# Patient Record
Sex: FEMALE | NOT HISPANIC OR LATINO | Employment: FULL TIME | URBAN - METROPOLITAN AREA
[De-identification: names, ages, dates, MRNs, and addresses within clinical notes are randomized per-mention and may not be internally consistent; named-entity substitution may affect disease eponyms.]

---

## 2019-04-07 ENCOUNTER — HOSPITAL ENCOUNTER (EMERGENCY)
Facility: CLINIC | Age: 49
Discharge: HOME | End: 2019-04-07
Attending: EMERGENCY MEDICINE | Admitting: EMERGENCY MEDICINE

## 2019-04-07 VITALS
WEIGHT: 180.12 LBS | DIASTOLIC BLOOD PRESSURE: 82 MMHG | SYSTOLIC BLOOD PRESSURE: 146 MMHG | TEMPERATURE: 97.8 F | HEART RATE: 68 BPM | RESPIRATION RATE: 16 BRPM | OXYGEN SATURATION: 99 %

## 2019-04-07 DIAGNOSIS — M54.50 ACUTE BILATERAL LOW BACK PAIN WITHOUT SCIATICA: ICD-10-CM

## 2019-04-07 LAB
ANION GAP SERPL CALCULATED.3IONS-SCNC: 4 MMOL/L (ref 3–14)
BASOPHILS # BLD AUTO: 0.1 10E9/L (ref 0–0.2)
BASOPHILS NFR BLD AUTO: 0.7 %
BUN SERPL-MCNC: 10 MG/DL (ref 7–30)
CALCIUM SERPL-MCNC: 9.7 MG/DL (ref 8.5–10.1)
CHLORIDE SERPL-SCNC: 99 MMOL/L (ref 94–109)
CO2 SERPL-SCNC: 31 MMOL/L (ref 20–32)
CREAT SERPL-MCNC: 0.65 MG/DL (ref 0.52–1.04)
DIFFERENTIAL METHOD BLD: NORMAL
EOSINOPHIL # BLD AUTO: 0.2 10E9/L (ref 0–0.7)
EOSINOPHIL NFR BLD AUTO: 2.3 %
ERYTHROCYTE [DISTWIDTH] IN BLOOD BY AUTOMATED COUNT: 12.8 % (ref 10–15)
GFR SERPL CREATININE-BSD FRML MDRD: >90 ML/MIN/{1.73_M2}
GLUCOSE BLDC GLUCOMTR-MCNC: 265 MG/DL (ref 70–99)
GLUCOSE BLDC GLUCOMTR-MCNC: 306 MG/DL (ref 70–99)
GLUCOSE BLDC GLUCOMTR-MCNC: 324 MG/DL (ref 70–99)
GLUCOSE SERPL-MCNC: 329 MG/DL (ref 70–99)
HCT VFR BLD AUTO: 41 % (ref 35–47)
HGB BLD-MCNC: 13.1 G/DL (ref 11.7–15.7)
IMM GRANULOCYTES # BLD: 0 10E9/L (ref 0–0.4)
IMM GRANULOCYTES NFR BLD: 0.3 %
LYMPHOCYTES # BLD AUTO: 2.3 10E9/L (ref 0.8–5.3)
LYMPHOCYTES NFR BLD AUTO: 31.4 %
MCH RBC QN AUTO: 27.1 PG (ref 26.5–33)
MCHC RBC AUTO-ENTMCNC: 32 G/DL (ref 31.5–36.5)
MCV RBC AUTO: 85 FL (ref 78–100)
MONOCYTES # BLD AUTO: 0.6 10E9/L (ref 0–1.3)
MONOCYTES NFR BLD AUTO: 7.5 %
NEUTROPHILS # BLD AUTO: 4.3 10E9/L (ref 1.6–8.3)
NEUTROPHILS NFR BLD AUTO: 57.8 %
NRBC # BLD AUTO: 0 10*3/UL
NRBC BLD AUTO-RTO: 0 /100
PLATELET # BLD AUTO: 323 10E9/L (ref 150–450)
POTASSIUM SERPL-SCNC: 4 MMOL/L (ref 3.4–5.3)
RBC # BLD AUTO: 4.83 10E12/L (ref 3.8–5.2)
SODIUM SERPL-SCNC: 134 MMOL/L (ref 133–144)
WBC # BLD AUTO: 7.4 10E9/L (ref 4–11)

## 2019-04-07 PROCEDURE — 96374 THER/PROPH/DIAG INJ IV PUSH: CPT

## 2019-04-07 PROCEDURE — 25000128 H RX IP 250 OP 636: Performed by: EMERGENCY MEDICINE

## 2019-04-07 PROCEDURE — 25000131 ZZH RX MED GY IP 250 OP 636 PS 637: Performed by: EMERGENCY MEDICINE

## 2019-04-07 PROCEDURE — 00000146 ZZHCL STATISTIC GLUCOSE BY METER IP

## 2019-04-07 PROCEDURE — 85025 COMPLETE CBC W/AUTO DIFF WBC: CPT | Performed by: EMERGENCY MEDICINE

## 2019-04-07 PROCEDURE — 96361 HYDRATE IV INFUSION ADD-ON: CPT

## 2019-04-07 PROCEDURE — 25000132 ZZH RX MED GY IP 250 OP 250 PS 637: Performed by: EMERGENCY MEDICINE

## 2019-04-07 PROCEDURE — 99284 EMERGENCY DEPT VISIT MOD MDM: CPT | Mod: 25

## 2019-04-07 PROCEDURE — 80048 BASIC METABOLIC PNL TOTAL CA: CPT | Performed by: EMERGENCY MEDICINE

## 2019-04-07 PROCEDURE — 96372 THER/PROPH/DIAG INJ SC/IM: CPT

## 2019-04-07 RX ORDER — METFORMIN HCL 500 MG
1000 TABLET, EXTENDED RELEASE 24 HR ORAL 2 TIMES DAILY WITH MEALS
Qty: 56 TABLET | Refills: 0 | Status: SHIPPED | OUTPATIENT
Start: 2019-04-07 | End: 2019-04-21

## 2019-04-07 RX ORDER — SIMVASTATIN 10 MG
10 TABLET ORAL AT BEDTIME
COMMUNITY

## 2019-04-07 RX ORDER — HYDROCODONE BITARTRATE AND ACETAMINOPHEN 5; 325 MG/1; MG/1
1 TABLET ORAL EVERY 6 HOURS PRN
COMMUNITY

## 2019-04-07 RX ORDER — KETOROLAC TROMETHAMINE 15 MG/ML
15 INJECTION, SOLUTION INTRAMUSCULAR; INTRAVENOUS ONCE
Status: COMPLETED | OUTPATIENT
Start: 2019-04-07 | End: 2019-04-07

## 2019-04-07 RX ORDER — NAPROXEN 500 MG/1
500 TABLET ORAL 2 TIMES DAILY WITH MEALS
COMMUNITY

## 2019-04-07 RX ORDER — GLIPIZIDE 10 MG/1
10 TABLET, FILM COATED, EXTENDED RELEASE ORAL DAILY
Qty: 14 TABLET | Refills: 0 | Status: SHIPPED | OUTPATIENT
Start: 2019-04-07 | End: 2019-04-21

## 2019-04-07 RX ORDER — HYDROCODONE BITARTRATE AND ACETAMINOPHEN 5; 325 MG/1; MG/1
1 TABLET ORAL ONCE
Status: COMPLETED | OUTPATIENT
Start: 2019-04-07 | End: 2019-04-07

## 2019-04-07 RX ORDER — METFORMIN HCL 500 MG
1000 TABLET, EXTENDED RELEASE 24 HR ORAL 2 TIMES DAILY WITH MEALS
COMMUNITY
End: 2019-04-07

## 2019-04-07 RX ORDER — GLIPIZIDE 10 MG/1
10 TABLET, FILM COATED, EXTENDED RELEASE ORAL DAILY
COMMUNITY
End: 2019-04-07

## 2019-04-07 RX ORDER — INSULIN GLARGINE 100 [IU]/ML
16 INJECTION, SOLUTION SUBCUTANEOUS DAILY
Qty: 2.24 ML | Refills: 0 | Status: SHIPPED | OUTPATIENT
Start: 2019-04-07 | End: 2019-04-21

## 2019-04-07 RX ORDER — CYCLOBENZAPRINE HCL 10 MG
10 TABLET ORAL 3 TIMES DAILY PRN
Qty: 20 TABLET | Refills: 0 | Status: SHIPPED | OUTPATIENT
Start: 2019-04-07 | End: 2019-04-13

## 2019-04-07 RX ADMIN — INSULIN ASPART 5 UNITS: 100 INJECTION, SOLUTION INTRAVENOUS; SUBCUTANEOUS at 12:05

## 2019-04-07 RX ADMIN — SODIUM CHLORIDE 1000 ML: 9 INJECTION, SOLUTION INTRAVENOUS at 10:56

## 2019-04-07 RX ADMIN — HYDROCODONE BITARTRATE AND ACETAMINOPHEN 1 TABLET: 5; 325 TABLET ORAL at 10:55

## 2019-04-07 RX ADMIN — KETOROLAC TROMETHAMINE 15 MG: 15 INJECTION, SOLUTION INTRAMUSCULAR; INTRAVENOUS at 11:37

## 2019-04-07 ASSESSMENT — ENCOUNTER SYMPTOMS
DIARRHEA: 0
NAUSEA: 0
VOMITING: 0
BACK PAIN: 1
FEVER: 0

## 2019-04-07 NOTE — ED PROVIDER NOTES
History     Chief Complaint:  Back Pain and Hyperglycemia    HPI   Nick Reno is a 48 year old female who presents with lower left/middle back pain. The patient states that she started to notice an onset of severe lower back pain this past Monday night roughly a week ago following the movement of heavy furniture. The patient states that her pain is localized in her lower left/middle back and worsens with movement or activity. The patient has been taking extra strength Tylenol as well as Naproxen, however due to the severity of her pain she started using Norco, once on Friday and twice on Saturday. The patient has not taken anything for pain thus far today and visits the emergency department to be further evaluated due to the persistency of her symptoms. The patient denies any vomiting, nausea, diarrhea, urinary symptoms, fever, or any other associated symptoms. Also has not been taking her diabetic medications regularly for awhile.    Allergies:  Lisinopril  Penicillins    Medications:    Glucotrol  Norco  Glucophage  Naprosyn  Zocor    Past Medical History:    Diabetes  Hypercholesteremia  Hypertension    Past Surgical History:    Uterine surgery    Family History:    The patient denies any relevant family medical history.    Social History:   Marital Status: Unknown  Alcohol Use: Unknown  Drug Use: Unknown     Review of Systems   Constitutional: Negative for fever.   Gastrointestinal: Negative for diarrhea, nausea and vomiting.   Genitourinary: Negative.    Musculoskeletal: Positive for back pain.   All other systems reviewed and are negative.      Physical Exam     Patient Vitals for the past 24 hrs:   BP Temp Temp src Pulse Heart Rate Resp SpO2 Weight   04/07/19 1019 -- -- -- -- -- -- -- 81.7 kg (180 lb 1.9 oz)   04/07/19 1010 125/78 97.8  F (36.6  C) Temporal 76 76 16 99 % --     Physical Exam  General: Resting comfortably on the gurney  Eyes:  The pupils are equal and round    Conjunctivae and sclerae  are normal  ENT:    Moist mucous membranes  Neck:  Normal range of motion  CV:  Regular rate and rhythm    Skin warm and well perfused    DP/PT pulses 2+ bilaterally   Resp:  Lungs are clear    Non-labored    No rales    No wheezing   GI:  Abdomen is soft, there is no rigidity    No distension    No rebound tenderness     No abdominal tenderness  MS:  Mild tenderness diffusely on lower back. No midline lumbar spine tenderness  Skin:  No rash or acute skin lesions noted on back  Neuro:   Awake, alert.      Speech is normal and fluent.    Face is symmetric.     Moves all extremities equally    SILT on bilateral LE    Patellar reflexes 2+ bilaterally  Psych: Normal affect.  Appropriate interactions.    Emergency Department Course     Laboratory:  BMP: Glucose 329 (H), o/w WNL. (Creatinine 0.65)  Glucose 324 (H)  CBC: WNL. (WBC 7.4, HGB 13.1, )     Interventions:  1055: 5-325 mg Norco PO  1056: NS 1L IV Bolus  1137: 15 mg Toradol IV  1205: 5 unites Novolog SC    Emergency Department Course:  Past medical records, nursing notes, and vitals reviewed.  1033: I performed an exam of the patient and obtained history, as documented above.      IV inserted and blood drawn.    I rechecked the patient. Findings and plan explained to the Patient. Patient discharged home with instructions regarding supportive care, medications, and reasons to return. The importance of close follow-up was reviewed.     Impression & Plan      Medical Decision Making:  Nick Reno is a 48 year old female who presents for evaluation of back pain that started after moving furniture.  Pain has improved with interventions in the emergency department. The patient did not sustain any trauma, therefore x-rays are not necessary due to the low likelihood of fracture or subluxation.  No red flag symptoms to suggest CT and/or MRI is indicated at this point.  There is no clinical evidence of cauda equina syndrome, discitis, spinal/epidural space  hematoma or epidural abscess or other worrisome etiology. The neurological exam is normal and the patient's symptoms seem consistent with musculoskeletal issues and significant muscle spasm.   Also has hyperglycemia bc of medication non compliance. No evidence of DKA or HHS. Given insulin in ED. Ice or heat to the back and stretching exercises. No heavy lifting, bending or twisting. Return if increasing pain, numbness, weakness, or bowel or bladder dysfunction. She was advised to schedule follow-up with her primary doctor and refill of medications given to patient.   Critical Care time:  none    Diagnosis:    ICD-10-CM    1. Acute bilateral low back pain without sciatica M54.5 Glucose by meter     Glucose by meter       Disposition:  discharged to home    Discharge Medications:     Medication List      Started    cyclobenzaprine 10 MG tablet  Commonly known as:  FLEXERIL  10 mg, Oral, 3 TIMES DAILY PRN     insulin glargine 100 UNIT/ML pen  16 Units, Subcutaneous, DAILY            Chris Urbano  4/7/2019   [facility] EMERGENCY DEPARTMENT    I, Chris Urbano, simon serving as a scribe at 10:33 AM on 4/7/2019 to document services personally performed by Molly Beasley MD based on my observations and the provider's statements to me.          Molly Beasley MD  04/07/19 6973

## 2019-04-07 NOTE — ED AVS SNAPSHOT
[facility] Emergency Department  [address]  [facility] [address]  Phone:  [phone]  Fax:  [phone]                                    Nick Reno   MRN: [id number]    Department:  [facility] Emergency Department   Date of Visit:  4/7/2019           After Visit Summary Signature Page    I have received my discharge instructions, and my questions have been answered. I have discussed any challenges I see with this plan with the nurse or doctor.    ..........................................................................................................................................  Patient/Patient Representative Signature      ..........................................................................................................................................  Patient Representative Print Name and Relationship to Patient    ..................................................               ................................................  Date                                   Time    ..........................................................................................................................................  Reviewed by Signature/Title    ...................................................              ..............................................  Date                                               Time          22EPIC Rev 08/18

## 2019-04-07 NOTE — DISCHARGE INSTRUCTIONS
Ibuprofen or naproxen for back pain. Both are NSAIDs so should only take one at a time  Use flexeril for muscle relaxation  Follow up with primary care provider  Continue your metformin, glipizide and restart insulin    Discharge Instructions  Back Pain  You were seen today for back pain. Back pain can have many causes, but most will get better without surgery or other specific treatment. Sometimes there is a herniated (?slipped?) disc. We do not usually do MRI scans to look for these right away, since most herniated discs will get better on their own with time.  Today, we did not find any evidence that your back pain was caused by a serious condition. However, sometimes symptoms develop over time and cannot be found during an emergency visit, so it is very important that you follow up with your primary provider.  Generally, every Emergency Department visit should have a follow-up clinic visit with either a primary or a specialty clinic/provider. Please follow-up as instructed by your emergency provider today.    Return to the Emergency Department if:  You develop a fever with your back pain.   You have weakness or change in sensation in one or both legs.  You lose control of your bowels or bladder, or cannot empty your bladder (cannot pee).  Your pain gets much worse.     Follow-up with your provider:  Unless your pain has completely gone away, please make an appointment with your provider within one week. Most of the routine care for back pain is available in a clinic and not the Emergency Department. You may need further management of your back pain, such as more pain medication, imaging such as an X-ray or MRI, or physical therapy.    What can I do to help myself?  Remain Active -- People are often afraid that they will hurt their back further or delay recovery by remaining active, but this is one of the best things you can do for your back. In fact, staying in bed for a long time to rest is not recommended.  Studies have shown that people with low back pain recover faster when they remain active. Movement helps to bring blood flow to the muscles and relieve muscle spasms as well as preventing loss of muscle strength.  Heat -- Using a heating pad can help with low back pain during the first few weeks. Do not sleep with a heating pad, as you can be burned.   Pain medications - You may take a pain medication such as Tylenol  (acetaminophen), Advil , Motrin  (ibuprofen) or Aleve  (naproxen).  If you were given a prescription for medicine here today, be sure to read all of the information (including the package insert) that comes with your prescription.  This will include important information about the medicine, its side effects, and any warnings that you need to know about.  The pharmacist who fills the prescription can provide more information and answer questions you may have about the medicine.  If you have questions or concerns that the pharmacist cannot address, please call or return to the Emergency Department.   Remember that you can always come back to the Emergency Department if you are not able to see your regular provider in the amount of time listed above, if you get any new symptoms, or if there is anything that worries you.

## 2019-04-07 NOTE — ED TRIAGE NOTES
Reports she has had low back pain radiating down to hips on Monday night, states pain is worsening this week. States tried vicodin and naproxen with little relief. Reports diabetic with metformin/glipizide/PRN insulin; states out of meds and does not get new insurance for another week. States off meds for 3 weeks.  Has not checked sugars for the past 3 weeks.    ABCs intact.

## 2020-04-20 ENCOUNTER — NURSE TRIAGE (OUTPATIENT)
Dept: NURSING | Facility: CLINIC | Age: 50
End: 2020-04-20

## 2020-04-20 ENCOUNTER — VIRTUAL VISIT (OUTPATIENT)
Dept: URGENT CARE | Facility: CLINIC | Age: 50
End: 2020-04-20
Payer: COMMERCIAL

## 2020-04-20 DIAGNOSIS — U07.1 COVID-19: Primary | ICD-10-CM

## 2020-04-20 PROCEDURE — 99203 OFFICE O/P NEW LOW 30 MIN: CPT | Mod: 95 | Performed by: STUDENT IN AN ORGANIZED HEALTH CARE EDUCATION/TRAINING PROGRAM

## 2020-04-20 NOTE — TELEPHONE ENCOUNTER
"Pt calls re \"definite exposure to COVID19\" over the past four weeks.    Then symptoms onset 10 days ago:  Cough -> dry, painful.  Fatigue.  Weakness.  Intermittent fevers.    \"Last night was the worst.\"  Fever was highest -> 101.7 (orally) before fever reducers.  Took \"Vicks multi-symptom\" product.  Then slept well.    \"Feeling a little better today.\"  NO fever currently.    \"Breathing is still kind of hard at times.\"  Occurs primarily with deep breaths only.  \"Pain in my middle back and into shoulder.\"    Able to eat/drink adequately.  Primarily \"soups and tea.\"    Pt states \"Already tried [employer] website.\"  \"Wouldn't go through with medical assistance.\"  Could not get past the 'Create Account' step.    Pt agrees to telephone provider visit (Virtual Urgent Care Provider) to discuss intermittent chest pain/mid-back pain with deep breathing.    Warm transferred to a  for this purpose now.    Pinky NAVARRO Health Nurse Advisor     Reason for Disposition    Chest pain     Occurs only with deep breaths.    Additional Information    Negative: SEVERE difficulty breathing (e.g., struggling for each breath, speaks in single words)    Negative: Difficult to awaken or acting confused (e.g., disoriented, slurred speech)    Negative: Bluish (or gray) lips or face now    Negative: Shock suspected (e.g., cold/pale/clammy skin, too weak to stand, low BP, rapid pulse)    Negative: Sounds like a life-threatening emergency to the triager    Negative: [1] COVID-19 suspected (e.g., cough, fever, shortness of breath) AND [2] public health department recommends testing    Negative: [1] COVID-19 exposure AND [2] no symptoms    Negative: COVID-19 and Breastfeeding, questions about    Negative: SEVERE or constant chest pain (Exception: mild central chest pain, present only when coughing)    Negative: MODERATE difficulty breathing (e.g., speaks in phrases, SOB even at rest, pulse 100-120)    Negative: Patient sounds very sick or " weak to the triager    Negative: MILD difficulty breathing (e.g., minimal/no SOB at rest, SOB with walking, pulse <100)    Protocols used: CORONAVIRUS (COVID-19) DIAGNOSED OR [id number] 3.30.20

## 2020-04-20 NOTE — PROGRESS NOTES
"The patient has been notified of following:     \"This telephone visit will be conducted via a call between you and your physician/provider. We have found that certain health care needs can be provided without the need for a physical exam.  This service lets us provide the care you need with a short phone conversation.  If a prescription is necessary we can send it directly to your pharmacy.  If lab work is needed we can place an order for that and you can then stop by our lab to have the test done at a later time.    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 Her last fever was last night, 101.7. She has not been working since 4/14. Her HR department is aware of her illness. Her cough is improving. She reports upper back pain that worsens with deep breaths, which is unchanged over the past week. She had a virtual care visit with her primary on 4/15, her symptoms have improved since then. She is no longer vomiting and has improved cough. She is requesting a work note.     In the 14 days before your symptoms started, have you had close contact with a COVID-19 (Coronavirus) patient? Yes     In the 14 days before your symptoms started, have you traveled internationally or to a state with high rates of COVID19? No    Do you have a fever? Yes, I have a fever higher than 101.4    Are you having difficulty breathing? Yes     Please describe what kind of difficulty you are having breathing. Mild (short of breath with walking)    Do you have a cough? Yes      Is your " coughing worse when you are exposed to pollen, dust, or other things in the environment? No      Are you coughing up any mucus? No, it's a dry cough    Are you experiencing any of the following? None of these    What other symptoms have you experienced? Body aches    Do you have any of the following? None of the above    Have you ever been diagnosed with asthma, bronchitis, or lung disease? No    Do you smoke? No     Have you ever smoked? I have never smoked       Are there people you know with similar symptoms? No    Have you recently been hospitalized? No    Are you pregnant or breastfeeding?: no      There is no problem list on file for this patient.    Past Surgical History:   Procedure Laterality Date     uterine surgery      fibroidectomy       Social History     Tobacco Use     Smoking status: Not on file   Substance Use Topics     Alcohol use: Not on file     No family history on file.      Current Outpatient Medications   Medication Sig Dispense Refill     glipiZIDE (GLUCOTROL XL) 10 MG 24 hr tablet Take 1 tablet (10 mg) by mouth daily for 14 days 14 tablet 0     HYDROcodone-acetaminophen (NORCO) 5-325 MG tablet Take 1 tablet by mouth every 6 hours as needed for severe pain       insulin glargine (BASAGLAR KWIKPEN) 100 UNIT/ML pen Inject 16 Units Subcutaneous daily for 14 days 2.24 mL 0     metFORMIN (GLUCOPHAGE-XR) 500 MG 24 hr tablet Take 2 tablets (1,000 mg) by mouth 2 times daily (with meals) for 14 days 56 tablet 0     naproxen (NAPROSYN) 500 MG tablet Take 500 mg by mouth 2 times daily (with meals)       NONFORMULARY Insulin pen, does not know dose       NONFORMULARY Pill for diabetes, unsure of name.       NONFORMULARY Cinnamon tablet       simvastatin (ZOCOR) 10 MG tablet Take 10 mg by mouth At Bedtime       Allergies   Allergen Reactions     Kiwi      Lisinopril      Penicillins      Soy [Isoflavones]             Review of Systems    ROS: 10 point ROS neg other than the symptoms noted above in the  HPI.        Objective    Gen: Patient is alert, oriented  Resp: Speaking full sentence, no audible shortness of breath.  No cough of wheeze.            Assessment/Plan:  Nick is a 49 year old woman with a history of poorly controlled diabetes, presenting with cough, fever, and fatigue for about a week and a half after known exposure to COVID-19 positive patients at her work. Her symptoms are improving, but she continues to have a fever. She is requesting a work note.    #COVID-19, presumed  Patient is improving, requesting a work not. She will access this through [employer]. Provided CDC guidelines for return to work. She must wait at least 72 hours from last fever (last night) to return to work.   -Work note provided  -[place]rolled in [place]    #Diabetes Mellitus, Type II  Poorly controlled. Last HgbA1c was 14. She takes Metformin and glipizide, as well as PRN insulin. She has missed several doses of her oral meds due to feeling unwell and has not been frequently checking her blood glucose. I instructed her to resume checking her BG three times per day (as she had been doing previously) and emphasized the importance of continuing her oral meds. She is at a higher risk of hyperglycemia given her acute infection.   -TID BG checks  -Continue metformin and glipizide  -Follow up with primary care for further DM management.     I discussed this patient with Dr. Cueva.    CLAUDIA Erickson MD  Internal Medicine-Pediatrics, MP-2  [phone]    Phone call duration:  20 minutes

## 2020-04-20 NOTE — LETTER
April 20, 2020      Nick Reno  [address]   [facility] [address]        To Whom It May Concern:    Nick Reno was seen in our clinic.     Per CDC guidelines, she can return to work if she has not had a fever for at least 72 hours (and has not used any fever-reducing medications) AND other symptoms have improved AND At least 7 days have passed since symptoms first appeared.    Given above guidelines, the earliest Nick could return to work is 4/23/2020, but must meet all requirements before returning.     Sincerely,    Benjamín Erickson MD

## 2020-04-20 NOTE — PROGRESS NOTES
I was present during the key/critical portion of the telephone visit. The patient acknowledged that I participated in the telephone conversation. I discussed the case with the resident and agree with the note as documented by the resident.    I personally spent a total of 3 minutes speaking with Nick Reno during today s visit.     Ronak Cueva MD  4/20/2020 at 2:41 PM        [facility]  Department of Family Medicine and [facility]

## 2020-04-20 NOTE — PATIENT INSTRUCTIONS
It was nice speaking with you this morning Nick. As we discussed on the phone, it is important that you continue to take your diabetes medications and check you blood sugar 3 times per day. You are at higher risk of elevated blood sugar with any infection.   As for timing to return to work, the CDC recommends that you stay in self-isolation until these three things have happened: 1) You have not had a fever for at least 72 hours (and have also not used any fever-reducing medications   during this 72-hour period); AND 2) Your other symptoms have improved; AND 3) At least 7 days have passed since your symptoms first appeared.

## 2020-07-13 DIAGNOSIS — Z11.59 SCREENING FOR VIRAL DISEASE: ICD-10-CM

## 2022-09-18 ENCOUNTER — HEALTH MAINTENANCE LETTER (OUTPATIENT)
Age: 52
End: 2022-09-18

## 2023-08-31 ENCOUNTER — HOSPITAL ENCOUNTER (EMERGENCY)
Facility: CLINIC | Age: 53
Discharge: HOME | End: 2023-09-01
Attending: EMERGENCY MEDICINE | Admitting: EMERGENCY MEDICINE
Payer: COMMERCIAL

## 2023-08-31 DIAGNOSIS — L03.113 CELLULITIS OF RIGHT ELBOW: ICD-10-CM

## 2023-08-31 LAB
BASOPHILS # BLD AUTO: 0.1 10E3/UL (ref 0–0.2)
BASOPHILS NFR BLD AUTO: 1 %
EOSINOPHIL # BLD AUTO: 0.2 10E3/UL (ref 0–0.7)
EOSINOPHIL NFR BLD AUTO: 2 %
ERYTHROCYTE [DISTWIDTH] IN BLOOD BY AUTOMATED COUNT: 13.3 % (ref 10–15)
HCT VFR BLD AUTO: 37.8 % (ref 35–47)
HGB BLD-MCNC: 12.3 G/DL (ref 11.7–15.7)
IMM GRANULOCYTES # BLD: 0 10E3/UL
IMM GRANULOCYTES NFR BLD: 0 %
LYMPHOCYTES # BLD AUTO: 2.9 10E3/UL (ref 0.8–5.3)
LYMPHOCYTES NFR BLD AUTO: 34 %
MCH RBC QN AUTO: 27.3 PG (ref 26.5–33)
MCHC RBC AUTO-ENTMCNC: 32.5 G/DL (ref 31.5–36.5)
MCV RBC AUTO: 84 FL (ref 78–100)
MONOCYTES # BLD AUTO: 0.6 10E3/UL (ref 0–1.3)
MONOCYTES NFR BLD AUTO: 7 %
NEUTROPHILS # BLD AUTO: 4.7 10E3/UL (ref 1.6–8.3)
NEUTROPHILS NFR BLD AUTO: 56 %
NRBC # BLD AUTO: 0 10E3/UL
NRBC BLD AUTO-RTO: 0 /100
PLATELET # BLD AUTO: 262 10E3/UL (ref 150–450)
RBC # BLD AUTO: 4.51 10E6/UL (ref 3.8–5.2)
WBC # BLD AUTO: 8.4 10E3/UL (ref 4–11)

## 2023-08-31 PROCEDURE — 99284 EMERGENCY DEPT VISIT MOD MDM: CPT | Mod: 25

## 2023-08-31 PROCEDURE — 85025 COMPLETE CBC W/AUTO DIFF WBC: CPT | Performed by: EMERGENCY MEDICINE

## 2023-08-31 PROCEDURE — 87070 CULTURE OTHR SPECIMN AEROBIC: CPT | Performed by: EMERGENCY MEDICINE

## 2023-08-31 PROCEDURE — 250N000011 HC RX IP 250 OP 636: Performed by: EMERGENCY MEDICINE

## 2023-08-31 PROCEDURE — 80048 BASIC METABOLIC PNL TOTAL CA: CPT | Performed by: EMERGENCY MEDICINE

## 2023-08-31 PROCEDURE — 36415 COLL VENOUS BLD VENIPUNCTURE: CPT | Performed by: EMERGENCY MEDICINE

## 2023-08-31 PROCEDURE — 96365 THER/PROPH/DIAG IV INF INIT: CPT

## 2023-08-31 RX ORDER — CLINDAMYCIN PHOSPHATE 600 MG/50ML
600 INJECTION, SOLUTION INTRAVENOUS ONCE
Status: COMPLETED | OUTPATIENT
Start: 2023-08-31 | End: 2023-09-01

## 2023-08-31 RX ADMIN — CLINDAMYCIN PHOSPHATE 600 MG: 600 INJECTION, SOLUTION INTRAVENOUS at 23:35

## 2023-08-31 ASSESSMENT — ACTIVITIES OF DAILY LIVING (ADL): ADLS_ACUITY_SCORE: 33

## 2023-08-31 NOTE — LETTER
September 1, 2023      To Whom It May Concern:      Nick Reno was seen in our Emergency Department today, 09/01/23.  I expect her condition to improve over the next 1-2 days.  She may return to work when improved.    Sincerely,        Alexander Merida RN

## 2023-09-01 VITALS
DIASTOLIC BLOOD PRESSURE: 80 MMHG | OXYGEN SATURATION: 100 % | SYSTOLIC BLOOD PRESSURE: 115 MMHG | HEART RATE: 95 BPM | WEIGHT: 157.41 LBS | RESPIRATION RATE: 18 BRPM | TEMPERATURE: 97.6 F

## 2023-09-01 LAB
ANION GAP SERPL CALCULATED.3IONS-SCNC: 10 MMOL/L (ref 7–15)
BUN SERPL-MCNC: 12.8 MG/DL (ref 6–20)
CALCIUM SERPL-MCNC: 9.8 MG/DL (ref 8.6–10)
CHLORIDE SERPL-SCNC: 100 MMOL/L (ref 98–107)
CREAT SERPL-MCNC: 0.8 MG/DL (ref 0.51–0.95)
DEPRECATED HCO3 PLAS-SCNC: 27 MMOL/L (ref 22–29)
GFR SERPL CREATININE-BSD FRML MDRD: 89 ML/MIN/1.73M2
GLUCOSE SERPL-MCNC: 191 MG/DL (ref 70–99)
HOLD SPECIMEN: NORMAL
HOLD SPECIMEN: NORMAL
POTASSIUM SERPL-SCNC: 3.9 MMOL/L (ref 3.4–5.3)
SODIUM SERPL-SCNC: 137 MMOL/L (ref 136–145)

## 2023-09-01 RX ORDER — CLINDAMYCIN HCL 300 MG
300 CAPSULE ORAL 3 TIMES DAILY
Qty: 30 CAPSULE | Refills: 0 | Status: SHIPPED | OUTPATIENT
Start: 2023-09-01 | End: 2023-09-11

## 2023-09-01 NOTE — ED PROVIDER NOTES
"  History     Chief Complaint:  Arm Pain       HPI   Nick Reno is a 52 year old female who presents with right elbow pain and swelling.  Patient is a 52-year-old female who is a diabetic.  Patient was seen in urgent care yesterday lab work shows a normal white count and a normal uric acid x-rays were done which were negative given antibiotics for cellulitis but is concerned that she is \"allergic to all of them and therefore did not take any.  Pain and swelling is been worse today and presents the emergency room for assessment.      Independent Historian:   None - Patient Only    Review of External Notes:          Medications:    glipiZIDE (GLUCOTROL XL) 10 MG 24 hr tablet  HYDROcodone-acetaminophen (NORCO) 5-325 MG tablet  insulin glargine (BASAGLAR KWIKPEN) 100 UNIT/ML pen  metFORMIN (GLUCOPHAGE-XR) 500 MG 24 hr tablet  naproxen (NAPROSYN) 500 MG tablet  NONFORMULARY  NONFORMULARY  NONFORMULARY  simvastatin (ZOCOR) 10 MG tablet        Past Medical History:    Past Medical History:   Diagnosis Date    Diabetes (H)     Hypercholesteremia     Hypertension        Past Surgical History:    Past Surgical History:   Procedure Laterality Date    uterine surgery      fibroidectomy        Physical Exam   Patient Vitals for the past 24 hrs:   BP Temp Temp src Pulse Resp SpO2 Weight   08/31/23 2208 (!) 134/90 97.6  F (36.4  C) Temporal 92 18 100 % 71.4 kg (157 lb 6.5 oz)        Physical Exam  Vitals and nursing note reviewed.   Cardiovascular:      Rate and Rhythm: Normal rate.   Pulmonary:      Effort: Pulmonary effort is normal.   Abdominal:      General: Abdomen is flat. Bowel sounds are normal.   Musculoskeletal:      Comments: There is a wound on the posterior aspect of the right olecranon process.  There is mild erythema and warmth.  No fluctuance.  Minimal drainage.   Skin:     General: Skin is warm.      Capillary Refill: Capillary refill takes less than 2 seconds.   Neurological:      Mental Status: She is " "alert.         Emergency Department Course       Laboratory:  Labs Ordered and Resulted from Time of ED Arrival to Time of ED Departure   BASIC METABOLIC PANEL - Abnormal       Result Value    Sodium 137      Potassium 3.9      Chloride 100      Carbon Dioxide (CO2) 27      Anion Gap 10      Urea Nitrogen 12.8      Creatinine 0.80      Calcium 9.8      Glucose 191 (*)     GFR Estimate 89     CBC WITH PLATELETS AND DIFFERENTIAL    WBC Count 8.4      RBC Count 4.51      Hemoglobin 12.3      Hematocrit 37.8      MCV 84      MCH 27.3      MCHC 32.5      RDW 13.3      Platelet Count 262      % Neutrophils 56      % Lymphocytes 34      % Monocytes 7      % Eosinophils 2      % Basophils 1      % Immature Granulocytes 0      NRBCs per 100 WBC 0      Absolute Neutrophils 4.7      Absolute Lymphocytes 2.9      Absolute Monocytes 0.6      Absolute Eosinophils 0.2      Absolute Basophils 0.1      Absolute Immature Granulocytes 0.0      Absolute NRBCs 0.0     AEROBIC BACTERIAL CULTURE ROUTINE          Emergency Department Course & Assessments:             Interventions:  Medications   clindamycin (CLEOCIN) 600 mg in 50 mL D5W intermittent infusion (has no administration in time range)        Assessments:      Independent Interpretation (X-rays, CTs, rhythm strip):  None    Consultations/Discussion of Management or Tests:  None        Social Determinants of Health affecting care:   None    Disposition:  The patient was discharged to home.     Impression & Plan        Medical Decision Making:  Presents with right elbow redness and swelling.  Seen in urgent care with white count normal uric acid negative x-rays normal yesterday.  Patient is a diabetic has not taken any of the antibiotics for she states she is allergic to \"all of them\".  IV was established IV clindamycin was offered due to his history of penicillin allergies.  All of her antibiotic allergies are not documented I am and unsure what her reactions are.  I did consider " septic bursitis.  Patient describes a wound there before the redness started and bumped her elbow.  Suspect skin infection rather than bursitis.  No incision and drainage recommended on her examination today.  Wound swab after Betadine prep was performed to evaluate for MRSA but was started on clindamycin and discharged home and encouraged to return with worsening redness or fever.    Critical Care time:  was 0 minutes for this patient excluding procedures.    Diagnosis:    ICD-10-CM    1. Cellulitis of right elbow  L03.113            Discharge Medications:  Discharge Medication List as of 9/1/2023 12:17 AM        START taking these medications    Details   clindamycin (CLEOCIN) 300 MG capsule Take 1 capsule (300 mg) by mouth 3 times daily for 10 days, Disp-30 capsule, R-0, Local Print                Bari Vivas MD  8/31/2023   Bari Vivas MD Goodman, Brian Samuel, MD  09/01/23 [id number]

## 2023-09-01 NOTE — DISCHARGE INSTRUCTIONS
We have performed a wound culture.  If this is positive for bacteria that needs a different antibiotic we will call you.  Use warm compresses and elevation.  Return with rapid increase in redness or swelling up the arm or fever greater than 100.4.  Please complete 10-day course of antibiotics as prescribed.  Thanks for your patience today.

## 2023-09-01 NOTE — ED TRIAGE NOTES
Pt arrives with right elbow swelling and pain for the past few days. Pt states she was seen at  and sent home with abx. Pt states she has not filled or taken any of these medications because she is allergic to most abx. When educated that this is the only way to treat cellulitis pt states understanding and agrees to take abx. Pt wants to be seen for increased swelling and pain. ABCs intact and Aox4.      Triage Assessment       Row Name 08/31/23 2623       Triage Assessment (Adult)    Airway WDL WDL       Respiratory WDL    Respiratory WDL WDL       Cardiac WDL    Cardiac WDL WDL

## 2023-09-03 LAB — BACTERIA WND CULT: ABNORMAL

## 2023-09-04 ENCOUNTER — TELEPHONE (OUTPATIENT)
Dept: EMERGENCY MEDICINE | Facility: CLINIC | Age: 53
End: 2023-09-04
Payer: COMMERCIAL

## 2023-09-04 NOTE — TELEPHONE ENCOUNTER
"[facility] Emergency Department/Urgent Care Lab result notification  [Note:  ED Lab Results RN will reference the [facility] Emergency Dept visit note prior to contacting patient AND/OR prior to consulting Emergency Dept Provider.  Highlights of Emergency Dept visit in information summary at the bottom of this telephone note]    1. Reason for call  Notify of lab results  Assess patient symptoms [if necessary]  Review ED Providers recommendations/discharge instructions (if necessary)  Advise per [facility] ED lab result protocol    2. Lab Result (including Rx patient on, if applicable).  If culture, copy of lab report at bottom.  Final Wound Aerobic Bacterial Culture (specimen - Elbow, Right; Wound) report on 9/3/23  [facility] Emergency Dept discharge antibiotic prescribed: Clindamycin (Cleocin) 300 mg PO capsule, 1 capsule (300 mg) by mouth 3 times daily for 10 days  #1. Bacteria, Staphylococcus aureus,  is [SUSCEPTIBLE] to antibiotic.  Incision and Drainage performed in the [place] ED: NO  Recommendations in treatment per [facility] ED lab result culture protocol    3. RN Assessment (Patient's current Symptoms):  Time of call: 9/4/2023 5:00 PM  Assessment: writer notes history of non-compliance with antibiotics d/t patient feels she is \"allergic to all of them\" - patient reports she is using antibiotic   Right Elbow:  'the wound is healing up and the swelling has gone down', less pain - less drainage, redness has gone  Fever: None    4. RN Recommendations/Instructions per [place] ED lab result protocol  [facility] ED lab result protocol used: Culture protocol  Patient was notified of lab result and treatment recommendations  RN reviewed information about continuing and completing antibiotic if symptoms improving, encouraged ED follow up wound check, probiotic appropriate, return for any worsening fever, redness, swelling, pain, warmth, drainage - all questions " answered patient verbalized understanding and agrees with plan.    5. Please Contact your PCP clinic or return to the Emergency department if your:  Symptoms return.  Symptoms do not improve after 3 days on antibiotic.  Symptoms do not resolve after completing antibiotic.  Symptoms worsen or other concerning symptoms.    Information summary from Emergency Dept/Urgent Care visit on 8/31/23  Allergies Allergies   Allergen Reactions    Kiwi     Lisinopril     Penicillins     Soy [Isoflavones (Soy)]       Weight, if applicable Wt Readings from Last 2 Encounters:   08/31/23 71.4 kg (157 lb 6.5 oz)   04/07/19 81.7 kg (180 lb 1.9 oz)      Coumadin/Warfarin [Yes /No] NO   Creatinine Level (mg/dl) Creatinine   Date Value Ref Range Status   08/31/2023 0.80 0.51 - 0.95 mg/dL Final   04/07/2019 0.65 0.52 - 1.04 mg/dL Final      Creatinine clearance (ml/min), if applicable Creatinine clearance cannot be calculated (Unknown ideal weight.)        Copy of Lab report (if applicable)  Wound Aerobic Bacterial Culture Routine  Order: [id number]  Status: Final result       Visible to patient: Yes (not seen)    Specimen Information: Elbow, Right; Wound   4 Result Notes  Culture 3+ Staphylococcus aureus Abnormal            Resulting Agency: [facility]     Susceptibility    Staphylococcus aureus (1)    Antibiotic Interpretation Sensitivity  Method Status    Oxacillin Susceptible <=0.25 ug/mL MADELEINE Final     Oxacillin susceptible isolates are susceptible to cephalosporins (example: cefazolin and cephalexin) and beta lactam combination agents. Oxacillin resistant isolates are resistant to these agents.       Gentamicin Susceptible <=0.5 ug/mL MADELEINE Final    Erythromycin Susceptible <=0.25 ug/mL MADELEINE Final    Clindamycin Susceptible 0.25 ug/mL MADELEINE Final    Vancomycin Susceptible <=0.5 ug/mL MADELEINE Final    Daptomycin Susceptible 0.25 ug/mL MADELEINE Final    Tetracycline Susceptible <=1 ug/mL MADELEINE Final    Doxycycline Susceptible <=0.5 ug/mL MADELEINE Final     Trimethoprim/Sulfamethoxazole Susceptible <=0.5/9.5 ug/mL MADELEINE Final          Specimen Collected: 08/31/23 10:56 PM Last Resulted: 09/03/23 10:31 AM               Mamadou Eaton RN  [facility]  Emergency Dept Lab Result RN  Ph# [phone]

## 2023-10-08 ENCOUNTER — HEALTH MAINTENANCE LETTER (OUTPATIENT)
Age: 53
End: 2023-10-08

## 2024-12-01 ENCOUNTER — HEALTH MAINTENANCE LETTER (OUTPATIENT)
Age: 54
End: 2024-12-01

## 2025-07-19 ENCOUNTER — HEALTH MAINTENANCE LETTER (OUTPATIENT)
Age: 55
End: 2025-07-19